# Patient Record
Sex: FEMALE | Race: WHITE | NOT HISPANIC OR LATINO | Employment: OTHER | URBAN - METROPOLITAN AREA
[De-identification: names, ages, dates, MRNs, and addresses within clinical notes are randomized per-mention and may not be internally consistent; named-entity substitution may affect disease eponyms.]

---

## 2023-07-14 DIAGNOSIS — E78.5 DYSLIPIDEMIA: Primary | ICD-10-CM

## 2023-07-14 DIAGNOSIS — I10 PRIMARY HYPERTENSION: ICD-10-CM

## 2023-07-14 RX ORDER — LISINOPRIL 40 MG/1
1 TABLET ORAL DAILY
COMMUNITY
Start: 2022-10-06 | End: 2023-07-14 | Stop reason: SDUPTHER

## 2023-07-14 RX ORDER — HYDROCHLOROTHIAZIDE 25 MG/1
25 TABLET ORAL DAILY
COMMUNITY
Start: 2023-06-05 | End: 2023-07-14 | Stop reason: SDUPTHER

## 2023-07-14 RX ORDER — PRAVASTATIN SODIUM 80 MG/1
1 TABLET ORAL DAILY
COMMUNITY
Start: 2021-04-20 | End: 2023-07-14 | Stop reason: SDUPTHER

## 2023-07-14 RX ORDER — PRAVASTATIN SODIUM 80 MG/1
80 TABLET ORAL DAILY
Qty: 90 TABLET | Refills: 3 | Status: SHIPPED | OUTPATIENT
Start: 2023-07-14 | End: 2023-08-21 | Stop reason: SDUPTHER

## 2023-07-14 RX ORDER — LISINOPRIL 40 MG/1
40 TABLET ORAL DAILY
Qty: 90 TABLET | Refills: 3 | Status: SHIPPED | OUTPATIENT
Start: 2023-07-14 | End: 2023-08-21 | Stop reason: SDUPTHER

## 2023-07-14 RX ORDER — HYDROCHLOROTHIAZIDE 25 MG/1
25 TABLET ORAL DAILY
Qty: 90 TABLET | Refills: 3 | Status: SHIPPED | OUTPATIENT
Start: 2023-07-14 | End: 2023-08-21 | Stop reason: SDUPTHER

## 2023-08-21 ENCOUNTER — OFFICE VISIT (OUTPATIENT)
Dept: PRIMARY CARE | Facility: CLINIC | Age: 68
End: 2023-08-21
Payer: MEDICARE

## 2023-08-21 VITALS
SYSTOLIC BLOOD PRESSURE: 120 MMHG | HEART RATE: 77 BPM | WEIGHT: 140 LBS | DIASTOLIC BLOOD PRESSURE: 70 MMHG | BODY MASS INDEX: 28.22 KG/M2 | HEIGHT: 59 IN | OXYGEN SATURATION: 96 % | TEMPERATURE: 97.7 F

## 2023-08-21 DIAGNOSIS — R10.13 EPIGASTRIC PAIN: ICD-10-CM

## 2023-08-21 DIAGNOSIS — F10.20 UNCOMPLICATED ALCOHOL DEPENDENCE (MULTI): ICD-10-CM

## 2023-08-21 DIAGNOSIS — K21.9 GERD WITHOUT ESOPHAGITIS: ICD-10-CM

## 2023-08-21 DIAGNOSIS — Z00.00 ROUTINE GENERAL MEDICAL EXAMINATION AT HEALTH CARE FACILITY: Primary | ICD-10-CM

## 2023-08-21 DIAGNOSIS — I10 PRIMARY HYPERTENSION: ICD-10-CM

## 2023-08-21 DIAGNOSIS — E78.5 DYSLIPIDEMIA: ICD-10-CM

## 2023-08-21 PROBLEM — F17.200 CURRENT SMOKER: Status: ACTIVE | Noted: 2023-08-21

## 2023-08-21 PROBLEM — E78.2 COMBINED HYPERLIPIDEMIA: Status: ACTIVE | Noted: 2023-08-21

## 2023-08-21 PROBLEM — H34.233: Status: ACTIVE | Noted: 2023-08-21

## 2023-08-21 PROBLEM — R00.2 PALPITATIONS: Status: ACTIVE | Noted: 2023-08-21

## 2023-08-21 LAB
ALANINE AMINOTRANSFERASE (SGPT) (U/L) IN SER/PLAS: 16 U/L (ref 7–45)
ALBUMIN (G/DL) IN SER/PLAS: 4.3 G/DL (ref 3.4–5)
ALKALINE PHOSPHATASE (U/L) IN SER/PLAS: 64 U/L (ref 33–136)
ANION GAP IN SER/PLAS: 14 MMOL/L (ref 10–20)
ASPARTATE AMINOTRANSFERASE (SGOT) (U/L) IN SER/PLAS: 15 U/L (ref 9–39)
BILIRUBIN TOTAL (MG/DL) IN SER/PLAS: 0.5 MG/DL (ref 0–1.2)
CALCIUM (MG/DL) IN SER/PLAS: 11 MG/DL (ref 8.6–10.6)
CARBON DIOXIDE, TOTAL (MMOL/L) IN SER/PLAS: 29 MMOL/L (ref 21–32)
CHLORIDE (MMOL/L) IN SER/PLAS: 95 MMOL/L (ref 98–107)
CHOLESTEROL (MG/DL) IN SER/PLAS: 189 MG/DL (ref 0–199)
CHOLESTEROL IN HDL (MG/DL) IN SER/PLAS: 54 MG/DL
CHOLESTEROL/HDL RATIO: 3.5
COBALAMIN (VITAMIN B12) (PG/ML) IN SER/PLAS: 436 PG/ML (ref 211–911)
CREATININE (MG/DL) IN SER/PLAS: 0.69 MG/DL (ref 0.5–1.05)
ERYTHROCYTE DISTRIBUTION WIDTH (RATIO) BY AUTOMATED COUNT: 12.9 % (ref 11.5–14.5)
ERYTHROCYTE MEAN CORPUSCULAR HEMOGLOBIN CONCENTRATION (G/DL) BY AUTOMATED: 33.3 G/DL (ref 32–36)
ERYTHROCYTE MEAN CORPUSCULAR VOLUME (FL) BY AUTOMATED COUNT: 91 FL (ref 80–100)
ERYTHROCYTES (10*6/UL) IN BLOOD BY AUTOMATED COUNT: 4.81 X10E12/L (ref 4–5.2)
GFR FEMALE: >90 ML/MIN/1.73M2
GLUCOSE (MG/DL) IN SER/PLAS: 90 MG/DL (ref 74–99)
HEMATOCRIT (%) IN BLOOD BY AUTOMATED COUNT: 43.6 % (ref 36–46)
HEMOGLOBIN (G/DL) IN BLOOD: 14.5 G/DL (ref 12–16)
LDL: 100 MG/DL (ref 0–99)
LEUKOCYTES (10*3/UL) IN BLOOD BY AUTOMATED COUNT: 8.9 X10E9/L (ref 4.4–11.3)
LIPASE (U/L) IN SER/PLAS: 14 U/L (ref 9–82)
NRBC (PER 100 WBCS) BY AUTOMATED COUNT: 0 /100 WBC (ref 0–0)
PLATELETS (10*3/UL) IN BLOOD AUTOMATED COUNT: 298 X10E9/L (ref 150–450)
POTASSIUM (MMOL/L) IN SER/PLAS: 4.9 MMOL/L (ref 3.5–5.3)
PROTEIN TOTAL: 7.2 G/DL (ref 6.4–8.2)
SODIUM (MMOL/L) IN SER/PLAS: 133 MMOL/L (ref 136–145)
TRIGLYCERIDE (MG/DL) IN SER/PLAS: 176 MG/DL (ref 0–149)
UREA NITROGEN (MG/DL) IN SER/PLAS: 11 MG/DL (ref 6–23)
VLDL: 35 MG/DL (ref 0–40)

## 2023-08-21 PROCEDURE — G0444 DEPRESSION SCREEN ANNUAL: HCPCS | Performed by: INTERNAL MEDICINE

## 2023-08-21 PROCEDURE — 83690 ASSAY OF LIPASE: CPT

## 2023-08-21 PROCEDURE — 1170F FXNL STATUS ASSESSED: CPT | Performed by: INTERNAL MEDICINE

## 2023-08-21 PROCEDURE — 3078F DIAST BP <80 MM HG: CPT | Performed by: INTERNAL MEDICINE

## 2023-08-21 PROCEDURE — 4004F PT TOBACCO SCREEN RCVD TLK: CPT | Performed by: INTERNAL MEDICINE

## 2023-08-21 PROCEDURE — G0439 PPPS, SUBSEQ VISIT: HCPCS | Performed by: INTERNAL MEDICINE

## 2023-08-21 PROCEDURE — 99406 BEHAV CHNG SMOKING 3-10 MIN: CPT | Performed by: INTERNAL MEDICINE

## 2023-08-21 PROCEDURE — 1159F MED LIST DOCD IN RCRD: CPT | Performed by: INTERNAL MEDICINE

## 2023-08-21 PROCEDURE — 85027 COMPLETE CBC AUTOMATED: CPT

## 2023-08-21 PROCEDURE — 1160F RVW MEDS BY RX/DR IN RCRD: CPT | Performed by: INTERNAL MEDICINE

## 2023-08-21 PROCEDURE — 82607 VITAMIN B-12: CPT

## 2023-08-21 PROCEDURE — 3074F SYST BP LT 130 MM HG: CPT | Performed by: INTERNAL MEDICINE

## 2023-08-21 PROCEDURE — 99214 OFFICE O/P EST MOD 30 MIN: CPT | Performed by: INTERNAL MEDICINE

## 2023-08-21 PROCEDURE — 80053 COMPREHEN METABOLIC PANEL: CPT

## 2023-08-21 PROCEDURE — 80061 LIPID PANEL: CPT

## 2023-08-21 RX ORDER — HYDROCHLOROTHIAZIDE 25 MG/1
25 TABLET ORAL DAILY
Qty: 90 TABLET | Refills: 3 | Status: SHIPPED | OUTPATIENT
Start: 2023-08-21

## 2023-08-21 RX ORDER — PRAVASTATIN SODIUM 80 MG/1
80 TABLET ORAL DAILY
Qty: 90 TABLET | Refills: 3 | Status: SHIPPED | OUTPATIENT
Start: 2023-08-21

## 2023-08-21 RX ORDER — OMEPRAZOLE 40 MG/1
40 CAPSULE, DELAYED RELEASE ORAL
Qty: 14 CAPSULE | Refills: 0 | Status: SHIPPED | OUTPATIENT
Start: 2023-08-21 | End: 2023-09-04

## 2023-08-21 RX ORDER — LISINOPRIL 40 MG/1
40 TABLET ORAL DAILY
Qty: 90 TABLET | Refills: 3 | Status: SHIPPED | OUTPATIENT
Start: 2023-08-21

## 2023-08-21 RX ORDER — ASPIRIN 81 MG/1
81 TABLET ORAL DAILY
COMMUNITY
End: 2023-08-21 | Stop reason: ALTCHOICE

## 2023-08-21 ASSESSMENT — ACTIVITIES OF DAILY LIVING (ADL)
DRESSING: INDEPENDENT
BATHING: INDEPENDENT
GROCERY_SHOPPING: INDEPENDENT
MANAGING_FINANCES: INDEPENDENT
DOING_HOUSEWORK: INDEPENDENT
TAKING_MEDICATION: INDEPENDENT

## 2023-08-21 ASSESSMENT — PATIENT HEALTH QUESTIONNAIRE - PHQ9
SUM OF ALL RESPONSES TO PHQ9 QUESTIONS 1 AND 2: 0
2. FEELING DOWN, DEPRESSED OR HOPELESS: NOT AT ALL
1. LITTLE INTEREST OR PLEASURE IN DOING THINGS: NOT AT ALL

## 2023-08-21 ASSESSMENT — ENCOUNTER SYMPTOMS
DEPRESSION: 0
OCCASIONAL FEELINGS OF UNSTEADINESS: 0
LOSS OF SENSATION IN FEET: 0

## 2023-08-21 NOTE — PROGRESS NOTES
Subjective   Vickie Abarca is a 68 y.o. female who presents for Medicare Annual Wellness Visit Subsequent and Follow-up.  HPI   Past medical history significant for many years of no primary care with uncontrolled hypertension, heavy smoking, alcohol dependence.      Overall feels good with no significant complaints.     Home blood pressure cuff brought in to the appointment with recent range from 113/57 to 145/72, averaging roughly around 120/70s.   No side effects or concerns.    Experiencing heartburn and reflux, almost every night after supper close to bedtime, takes 2 Tums every night which does help.  Also having intermittent epigastric sharp pains, no obvious aggravating or alleviating or causative factor.  No melena hematochezia or BRBPR.  Does describe some change in color of her urine, says it has been a light orange color.  Increased frequency and urgency without dysuria, not interested in treatment.  In the past had reported hematuria, reported as bright red blood in the urine occurring almost daily for the last 13 years, UA was normal.     She is still smoking 1.5 ppd (down from 3ppd originally though precontemplative about cutting down further)  She is still drinking 2-3 beers per night (down from 12 nightly though precontemplative about cutting down further)     Has a chronic cough at baseline but has not worsened, no chest pain, no dyspnea on exertion.      Doesn't leave the house except once a year and for doctors appointments, when asked if there might be an anxiety component to this she doesn't engage much and prefers that it be left alone.  Also similar regarding screening testing.     She has declined almost all screening tests, says she would not likely want any kind of treatment, certainly not chemotherapy if the cancer were to be found.  Discussed potential treatable cancers early stage but she does not want to go through the testing anyway.  She would like to get more information about a  living will and durable healthcare power of , reports her wish to not be resuscitated in the event of the cardiac arrest.         She is a very heavy smoker, 4-5 packs per day for many years, total of over 90 pack years. She also enjoys drinking beer regularly, cut down to 2-3 beers per night.     Initial visit was preceded by her eye doctor finding a plaque in the high concerning for atherosclerotic disease. She already had echocardiogram which showed impaired relaxation, carotid duplex was normal.  She was evaluated by Dr. Perez at [facility], found to have macular degeneration in the right eye and arterial plaques. Initial injections completed. Still has blurry vision in her right eye. Due for follow-up.     Tries to eat healthy. Does not exercise but is active cleaning the house. walks up and down the stairs.          She has 5 children, one passed in 2020 from overdose. Currently living with her daughter and family.      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)   · Current smoker (305.1) (F17.200)   · Encounter for screening for malignant neoplasm of lung (V76.0) (Z12.2)   · Encounter for screening for other disorder (V82.89) (Z13.89)   · Encounter for screening mammogram for breast cancer (V76.12) (Z12.31)   · GERD (gastroesophageal reflux disease) (530.81) (K21.9)   · History of hematuria (V13.09) (Z87.448)   · Hypercalcemia (275.42) (E83.52)   · Hypertension (401.9) (I10)   · Insect bite (919.4,E906.4) (W57.XXXA)   · Palpitations (785.1) (R00.2)   · 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)     Past Medical History     · History of Abdominal pain, chronic, epigastric (789.06,338.29) (R10.13,G89.29)   · Resolved Date: 05 Jul 2019   · History of hematuria (V13.09) (Z87.448)   · Resolved Date: 16 Apr 2019   · History of malignant neoplasm of skin (V10.83) (Z85.828)   · History of palpitations  "(V12.59) (Z87.898)   · Resolved Date: 05 Jul 2019     Surgical History     · History of Skin lesion excision   · History of Tubal ligation     Social History     · Current smoker (305.1) (F17.200)     Allergies     · Aspirin TABS   Adverse Reaction; Gatrointestinal upset; Vomiting; Updated By: Clarke Lorenzo; 4/16/2019 10:56:58 AM   · Codeine Derivatives   Rash; Recorded By: Clarke Lorenzo; 4/2/2019 2:13:59 PM  Objective   /78   Pulse 77   Temp 36.5 °C (97.7 °F)   Ht 1.499 m (4' 11\")   Wt 63.5 kg (140 lb)   SpO2 96%   BMI 28.28 kg/m²    Physical Exam  Gen: NAD, pleasant, A&Ox4  HEENT: EOMI, MMM  Neck: supple, no JVD  Pulm: lungs clear with mildly decreased air movement, normal work of breathing on room air  CV: RRR, no m/r/g  Abd: NABS, soft, nondistended, epigastric tenderness without guarding or rebound  Ext: no peripheral edema or cyanosis  Neuro: CN II-XII grossly intact, no focal sensory or motor deficits  MSK: no tenderness to palpation down spine  Assessment/Plan     ASCVD: Likely diffuse, arterial plaques found on opthalmic exam, stopped atorvastatin 20 mg due to cost, started pravastatin 40mg October 2021; increased to 80mg in feb 2022, cont. Discontinue aspirin given risk/benefit and prior GI intolerance.  - LDL 92, 10/19/2022    GERD with gastritis: Trial of PPI for 2 weeks  -consider future EGD especially given family history of throat cancer and patient's smoking history, previously declined     Hypertension: Significantly improved control, likely very chronic given she has not followed with primary care in years until establishing here. With her ASCVD, ACE inhibitor warranted.   - cont lisinopril 40mg, Hctz 25mg. Home blood pressure cuff calibrated 10/18/2021.     Palpitations/dizziness noted on prior visits: ordered Zio patch for monitoring, not done and not interested in doing that today; appears to have mild orthostasis, normal carotid duplex in 2019; has been less of a concern, " infrequent     Hypercalcemia: Last levels were normal when corrected for albumin, vitamin D levels are low        Heavy smoker: Declines CT and AAA screening or pharmacologic measures to help quit, has cut down significantly but is precontemplative about cutting down further (1.5ppd smoker now from 3ppd)     Alcohol dependence: Has tapered down to 2- 3 drinks daily, encouraged continued tapering but is precontemplative about cutting down further.     Hematuria: None on urinalysis, 4/2/2019, light-orange urine is not likely a concern, will monitor     HM: Will continue to discuss health maintenance and address what the patient is agreeable to doing. Patient expresses that she has mostly declined screening tests because she would not be interested in most interventions if these screenings were to come up positive   Mammogram- ordered, not done, not willing  Pap smear- not indicated given age  LD CT- patient declines  Colon cancer- patient is considering Cologuard, but declines today, will think about it  Vaccines-declining all vaccines at this time including flu  Discussed healthy eating and exercise  Encouraged smoking cessation  DEXA scan ordered, not done and says she does not want to do it  Problem List Items Addressed This Visit       Uncomplicated alcohol dependence (CMS/HCC)    Relevant Medications    omeprazole (PriLOSEC) 40 mg DR capsule    Other Relevant Orders    Comprehensive Metabolic Panel    Lipid Panel    CBC    Vitamin B12    Lipase     Other Visit Diagnoses       Routine general medical examination at health care facility    -  Primary    Relevant Medications    omeprazole (PriLOSEC) 40 mg DR capsule    Other Relevant Orders    Comprehensive Metabolic Panel    Lipid Panel    CBC    Vitamin B12    Lipase    Primary hypertension        Relevant Medications    hydroCHLOROthiazide (HYDRODiuril) 25 mg tablet    lisinopril 40 mg tablet    omeprazole (PriLOSEC) 40 mg DR capsule    Other Relevant Orders     Comprehensive Metabolic Panel    Lipid Panel    CBC    Vitamin B12    Lipase    Dyslipidemia        Relevant Medications    pravastatin (Pravachol) 80 mg tablet    omeprazole (PriLOSEC) 40 mg DR capsule    Other Relevant Orders    Comprehensive Metabolic Panel    Lipid Panel    CBC    Vitamin B12    Lipase    Epigastric pain        Relevant Medications    omeprazole (PriLOSEC) 40 mg DR capsule    Other Relevant Orders    Comprehensive Metabolic Panel    Lipid Panel    CBC    Vitamin B12    Lipase    GERD without esophagitis        Relevant Medications    omeprazole (PriLOSEC) 40 mg DR capsule    Other Relevant Orders    Comprehensive Metabolic Panel    Lipid Panel    CBC    Vitamin B12    Lipase                 Clarke Lorenzo MD

## 2024-02-20 ENCOUNTER — OFFICE VISIT (OUTPATIENT)
Dept: PRIMARY CARE | Facility: CLINIC | Age: 69
End: 2024-02-20
Payer: MEDICARE

## 2024-02-20 VITALS
BODY MASS INDEX: 27.82 KG/M2 | SYSTOLIC BLOOD PRESSURE: 120 MMHG | HEART RATE: 78 BPM | HEIGHT: 59 IN | WEIGHT: 138 LBS | DIASTOLIC BLOOD PRESSURE: 70 MMHG | OXYGEN SATURATION: 96 % | TEMPERATURE: 97.3 F

## 2024-02-20 DIAGNOSIS — I10 PRIMARY HYPERTENSION: ICD-10-CM

## 2024-02-20 DIAGNOSIS — F10.20 UNCOMPLICATED ALCOHOL DEPENDENCE (MULTI): ICD-10-CM

## 2024-02-20 DIAGNOSIS — K21.9 GASTROESOPHAGEAL REFLUX DISEASE, UNSPECIFIED WHETHER ESOPHAGITIS PRESENT: Primary | ICD-10-CM

## 2024-02-20 DIAGNOSIS — R30.0 DYSURIA: ICD-10-CM

## 2024-02-20 DIAGNOSIS — E83.52 HYPERCALCEMIA: ICD-10-CM

## 2024-02-20 DIAGNOSIS — E55.9 VITAMIN D DEFICIENCY: ICD-10-CM

## 2024-02-20 LAB
APPEARANCE UR: CLEAR
BILIRUB UR QL STRIP: NEGATIVE
COLOR UR: YELLOW
ERYTHROCYTE [DISTWIDTH] IN BLOOD BY AUTOMATED COUNT: 13.2 % (ref 11.5–14.5)
GLUCOSE UR STRIP-MCNC: NEGATIVE MG/DL
HCT VFR BLD AUTO: 44.4 % (ref 36–46)
HGB BLD-MCNC: 14.6 G/DL (ref 12–16)
HGB UR QL STRIP: NEGATIVE
KETONES UR STRIP-MCNC: NEGATIVE MG/DL
LEUKOCYTE ESTERASE UR QL STRIP: ABNORMAL
MCH RBC QN AUTO: 30.3 PG (ref 26–34)
MCHC RBC AUTO-ENTMCNC: 32.9 G/DL (ref 32–36)
MCV RBC AUTO: 92 FL (ref 80–100)
NITRITE UR QL STRIP: NEGATIVE
NRBC BLD-RTO: 0 /100 WBCS (ref 0–0)
PH UR STRIP: 7 [PH]
PLATELET # BLD AUTO: 314 X10*3/UL (ref 150–450)
PROT UR STRIP-MCNC: NEGATIVE MG/DL
RBC # BLD AUTO: 4.82 X10*6/UL (ref 4–5.2)
SP GR UR STRIP.AUTO: 1.01
UROBILINOGEN UR STRIP-ACNC: 0.2 E.U./DL
WBC # BLD AUTO: 8.3 X10*3/UL (ref 4.4–11.3)

## 2024-02-20 PROCEDURE — 3074F SYST BP LT 130 MM HG: CPT | Performed by: INTERNAL MEDICINE

## 2024-02-20 PROCEDURE — 81003 URINALYSIS AUTO W/O SCOPE: CPT | Performed by: INTERNAL MEDICINE

## 2024-02-20 PROCEDURE — 3078F DIAST BP <80 MM HG: CPT | Performed by: INTERNAL MEDICINE

## 2024-02-20 PROCEDURE — 36415 COLL VENOUS BLD VENIPUNCTURE: CPT

## 2024-02-20 PROCEDURE — 80061 LIPID PANEL: CPT

## 2024-02-20 PROCEDURE — 83970 ASSAY OF PARATHORMONE: CPT

## 2024-02-20 PROCEDURE — 99214 OFFICE O/P EST MOD 30 MIN: CPT | Performed by: INTERNAL MEDICINE

## 2024-02-20 PROCEDURE — 82306 VITAMIN D 25 HYDROXY: CPT

## 2024-02-20 PROCEDURE — 1170F FXNL STATUS ASSESSED: CPT | Performed by: INTERNAL MEDICINE

## 2024-02-20 PROCEDURE — 80053 COMPREHEN METABOLIC PANEL: CPT

## 2024-02-20 PROCEDURE — 85027 COMPLETE CBC AUTOMATED: CPT

## 2024-02-20 ASSESSMENT — ACTIVITIES OF DAILY LIVING (ADL)
BATHING: INDEPENDENT
MANAGING_FINANCES: TOTAL CARE
DOING_HOUSEWORK: INDEPENDENT
TAKING_MEDICATION: INDEPENDENT
DRESSING: INDEPENDENT
GROCERY_SHOPPING: TOTAL CARE

## 2024-02-20 ASSESSMENT — ENCOUNTER SYMPTOMS
LOSS OF SENSATION IN FEET: 0
OCCASIONAL FEELINGS OF UNSTEADINESS: 0
DEPRESSION: 0

## 2024-02-20 NOTE — PROGRESS NOTES
Subjective   Vickie Abarca is a 69 y.o. female who presents for Follow-up.  HPI   Past medical history significant for many years of no primary care with uncontrolled hypertension, heavy smoking, alcohol dependence.      Overall feels good with no significant complaints.     Home blood pressure cuff brought in to the appointment with recent range from 113/57 to 145/72, averaging roughly around 120/70s.   No side effects or concerns.    Still experiencing heartburn and reflux, almost every night after supper close to bedtime, takes 2 Tums every night which does help.    Prescribed omeprazole at the last visit but she did not like having to take the extra tablet especially trying to remember before eating and concerned about cost.  Also having intermittent epigastric sharp pains, no obvious aggravating or alleviating or causative factor.  No melena hematochezia or BRBPR.  Also has intermittent constipation, she does use the bathroom daily but sometimes needs to take a laxative, sometimes she has abdominal pain which resolves with having a bowel movement.  When she has constipation her bowel movements tend to be small round and pellet-like.    Reports that urine is dark and opaque.  Notices this in the morning as she urinates in the bucket overnight and out of fear of falling down the stairs she does not go to the bathroom until the morning when she empties out the bucket.  Says it comes out somewhat dark and red-tinged.  When she goes to the bathroom she has not noticed any color changes except for darker urine.  No dysuria, increased urgency or frequency.  Possibly less urine output altogether, ongoing for last 6 months.  In the past had reported hematuria, reported as bright red blood in the urine occurring almost daily for the last 13 years, UA was normal.     She is still smoking 1.5 ppd (down from 3ppd originally though precontemplative about cutting down further)  She is still drinking 2-3 beers per night (down  from 12 nightly though precontemplative about cutting down further)    **COPIED FORWARD FOR REFERENCE**     Has a chronic cough at baseline but has not worsened, no chest pain, no dyspnea on exertion.      Doesn't leave the house except once a year and for doctors appointments, when asked if there might be an anxiety component to this she doesn't engage much and prefers that it be left alone.  Also similar regarding screening testing.     She has declined almost all screening tests, says she would not likely want any kind of treatment, certainly not chemotherapy if the cancer were to be found.  Discussed potential treatable cancers early stage but she does not want to go through the testing anyway.  She would like to get more information about a living will and durable healthcare power of , reports her wish to not be resuscitated in the event of the cardiac arrest.         She is a very heavy smoker, 4-5 packs per day for many years, total of over 90 pack years. She also enjoys drinking beer regularly, cut down to 2-3 beers per night.     Initial visit was preceded by her eye doctor finding a plaque in the high concerning for atherosclerotic disease. She already had echocardiogram which showed impaired relaxation, carotid duplex was normal.  She was evaluated by Dr. Perez at [facility], found to have macular degeneration in the right eye and arterial plaques. Initial injections completed. Still has blurry vision in her right eye. Due for follow-up.     Tries to eat healthy. Does not exercise but is active cleaning the house. walks up and down the stairs.          She has 5 children, one passed in 2020 from overdose. Currently living with her daughter and family.      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)   · Current smoker (305.1) (F17.200)   · Encounter for screening for malignant neoplasm of lung (V76.0) (Z12.2)   ·  "Encounter for screening for other disorder (V82.89) (Z13.89)   · Encounter for screening mammogram for breast cancer (V76.12) (Z12.31)   · GERD (gastroesophageal reflux disease) (530.81) (K21.9)   · History of hematuria (V13.09) (Z87.448)   · Hypercalcemia (275.42) (E83.52)   · Hypertension (401.9) (I10)   · Insect bite (919.4,E906.4) (W57.XXXA)   · Palpitations (785.1) (R00.2)   · 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)     Past Medical History     · History of Abdominal pain, chronic, epigastric (789.06,338.29) (R10.13,G89.29)   · Resolved Date: 05 Jul 2019   · History of hematuria (V13.09) (Z87.448)   · Resolved Date: 16 Apr 2019   · History of malignant neoplasm of skin (V10.83) (Z85.828)   · History of palpitations (V12.59) (Z87.898)   · Resolved Date: 05 Jul 2019     Surgical History     · History of Skin lesion excision   · History of Tubal ligation     Social History     · Current smoker (305.1) (F17.200)     Allergies     · Aspirin TABS   Adverse Reaction; Gatrointestinal upset; Vomiting; Updated By: Clarke Lorenzo; 4/16/2019 10:56:58 AM   · Codeine Derivatives   Rash; Recorded By: Clarke Lorenzo; 4/2/2019 2:13:59 PM  Objective   /78   Pulse 78   Temp 36.3 °C (97.3 °F)   Ht 1.499 m (4' 11\")   Wt 62.6 kg (138 lb)   SpO2 96%   BMI 27.87 kg/m²    Physical Exam  Gen: NAD, pleasant, A&Ox4  HEENT: EOMI, MMM  Neck: supple, no JVD  Pulm: lungs clear with mildly decreased air movement, normal work of breathing on room air  CV: RRR, no m/r/g  Abd: NABS, soft, nondistended, epigastric tenderness without guarding or rebound  Ext: no peripheral edema or cyanosis  Neuro: CN II-XII grossly intact, no focal sensory or motor deficits  MSK: no tenderness to palpation down spine  Assessment/Plan     ASCVD: Likely diffuse, arterial plaques found on opthalmic exam, stopped atorvastatin 20 mg due to cost, started pravastatin 40mg October 2021; increased to 80mg in feb 2022, cont. " Discontinue aspirin given risk/benefit and prior GI intolerance.  - LDL 92, 10/19/2022    GERD with gastritis: Trial of PPI   -consider future EGD especially given family history of throat cancer and patient's smoking history, previously declined    Dark urine: Likely sediment in the urine that is left out overnight, no other symptoms, with her age and smoking history, microscopic hematuria/bladder cancer needs to be considered.  We performed a UA today which was unremarkable, she was given urine sample cups in case she has visible hematuria in the future.     Hypertension: Significantly improved control, likely very chronic given she has not followed with primary care in years until establishing here. With her ASCVD, ACE inhibitor warranted.   - cont lisinopril 40mg, Hctz 25mg. Home blood pressure cuff calibrated 10/18/2021.     Palpitations/dizziness noted on prior visits: ordered Zio patch for monitoring, not done and not interested in doing that today; appears to have mild orthostasis, normal carotid duplex in 2019; has been less of a concern, infrequent     Hypercalcemia: Last levels were normal when corrected for albumin, vitamin D levels are low        Heavy smoker: Declines CT and AAA screening or pharmacologic measures to help quit, has cut down significantly but is precontemplative about cutting down further (1.5ppd smoker now from 3ppd)     Alcohol dependence: Has tapered down to 2- 3 drinks daily, encouraged continued tapering but is precontemplative about cutting down further.     Hematuria: None on urinalysis, 4/2/2019, light-orange urine is not likely a concern, will monitor     HM: Will continue to discuss health maintenance and address what the patient is agreeable to doing. Patient expresses that she has mostly declined screening tests because she would not be interested in most interventions if these screenings were to come up positive   Mammogram- ordered, not done, not willing  Pap smear- not  indicated given age  LD CT- patient declines  Colon cancer- patient is considering Cologuard, but declines today, will think about it  Vaccines-declining all vaccines at this time including flu  Discussed healthy eating and exercise  Encouraged smoking cessation  DEXA scan ordered, not done and says she does not want to do it  Problem List Items Addressed This Visit    None  Visit Diagnoses       Dysuria        Relevant Orders    POCT UA (Automated) docked device                 Clarke Lorenzo MD

## 2024-02-21 LAB
25(OH)D3 SERPL-MCNC: 7 NG/ML (ref 30–100)
ALBUMIN SERPL BCP-MCNC: 4.6 G/DL (ref 3.4–5)
ALP SERPL-CCNC: 69 U/L (ref 33–136)
ALT SERPL W P-5'-P-CCNC: 14 U/L (ref 7–45)
ANION GAP SERPL CALC-SCNC: 16 MMOL/L (ref 10–20)
AST SERPL W P-5'-P-CCNC: 16 U/L (ref 9–39)
BILIRUB SERPL-MCNC: 0.5 MG/DL (ref 0–1.2)
BUN SERPL-MCNC: 12 MG/DL (ref 6–23)
CALCIUM SERPL-MCNC: 11.2 MG/DL (ref 8.6–10.6)
CHLORIDE SERPL-SCNC: 98 MMOL/L (ref 98–107)
CHOLEST SERPL-MCNC: 195 MG/DL (ref 0–199)
CHOLESTEROL/HDL RATIO: 3.4
CO2 SERPL-SCNC: 29 MMOL/L (ref 21–32)
CREAT SERPL-MCNC: 0.71 MG/DL (ref 0.5–1.05)
EGFRCR SERPLBLD CKD-EPI 2021: >90 ML/MIN/1.73M*2
GLUCOSE SERPL-MCNC: 90 MG/DL (ref 74–99)
HDLC SERPL-MCNC: 57.2 MG/DL
LDLC SERPL CALC-MCNC: 105 MG/DL
NON HDL CHOLESTEROL: 138 MG/DL (ref 0–149)
POTASSIUM SERPL-SCNC: 5.1 MMOL/L (ref 3.5–5.3)
PROT SERPL-MCNC: 7.2 G/DL (ref 6.4–8.2)
PTH-INTACT SERPL-MCNC: 71.8 PG/ML (ref 18.5–88)
SODIUM SERPL-SCNC: 138 MMOL/L (ref 136–145)
TRIGL SERPL-MCNC: 163 MG/DL (ref 0–149)
VLDL: 33 MG/DL (ref 0–40)

## 2024-02-29 RX ORDER — ERGOCALCIFEROL 1.25 MG/1
50000 CAPSULE ORAL
Qty: 4 CAPSULE | Refills: 2 | Status: SHIPPED | OUTPATIENT
Start: 2024-02-29 | End: 2024-05-23

## 2024-08-20 ENCOUNTER — APPOINTMENT (OUTPATIENT)
Dept: PRIMARY CARE | Facility: CLINIC | Age: 69
End: 2024-08-20
Payer: MEDICARE

## 2024-08-20 VITALS
HEART RATE: 78 BPM | SYSTOLIC BLOOD PRESSURE: 134 MMHG | OXYGEN SATURATION: 96 % | BODY MASS INDEX: 28.02 KG/M2 | DIASTOLIC BLOOD PRESSURE: 82 MMHG | HEIGHT: 59 IN | WEIGHT: 139 LBS

## 2024-08-20 DIAGNOSIS — F17.200 CURRENT SMOKER: ICD-10-CM

## 2024-08-20 DIAGNOSIS — K21.9 GASTROESOPHAGEAL REFLUX DISEASE, UNSPECIFIED WHETHER ESOPHAGITIS PRESENT: ICD-10-CM

## 2024-08-20 DIAGNOSIS — F10.20 UNCOMPLICATED ALCOHOL DEPENDENCE (MULTI): ICD-10-CM

## 2024-08-20 DIAGNOSIS — Z00.00 ROUTINE GENERAL MEDICAL EXAMINATION AT HEALTH CARE FACILITY: Primary | ICD-10-CM

## 2024-08-20 DIAGNOSIS — I10 PRIMARY HYPERTENSION: ICD-10-CM

## 2024-08-20 PROCEDURE — G0439 PPPS, SUBSEQ VISIT: HCPCS | Performed by: INTERNAL MEDICINE

## 2024-08-20 PROCEDURE — G0444 DEPRESSION SCREEN ANNUAL: HCPCS | Performed by: INTERNAL MEDICINE

## 2024-08-20 PROCEDURE — 1159F MED LIST DOCD IN RCRD: CPT | Performed by: INTERNAL MEDICINE

## 2024-08-20 PROCEDURE — 3008F BODY MASS INDEX DOCD: CPT | Performed by: INTERNAL MEDICINE

## 2024-08-20 PROCEDURE — 1170F FXNL STATUS ASSESSED: CPT | Performed by: INTERNAL MEDICINE

## 2024-08-20 PROCEDURE — 4004F PT TOBACCO SCREEN RCVD TLK: CPT | Performed by: INTERNAL MEDICINE

## 2024-08-20 PROCEDURE — 3078F DIAST BP <80 MM HG: CPT | Performed by: INTERNAL MEDICINE

## 2024-08-20 PROCEDURE — 1160F RVW MEDS BY RX/DR IN RCRD: CPT | Performed by: INTERNAL MEDICINE

## 2024-08-20 PROCEDURE — G0446 INTENS BEHAVE THER CARDIO DX: HCPCS | Performed by: INTERNAL MEDICINE

## 2024-08-20 PROCEDURE — 1123F ACP DISCUSS/DSCN MKR DOCD: CPT | Performed by: INTERNAL MEDICINE

## 2024-08-20 PROCEDURE — 99406 BEHAV CHNG SMOKING 3-10 MIN: CPT | Performed by: INTERNAL MEDICINE

## 2024-08-20 PROCEDURE — 3075F SYST BP GE 130 - 139MM HG: CPT | Performed by: INTERNAL MEDICINE

## 2024-08-20 ASSESSMENT — ACTIVITIES OF DAILY LIVING (ADL)
GROCERY_SHOPPING: INDEPENDENT
TAKING_MEDICATION: INDEPENDENT
DRESSING: INDEPENDENT
DOING_HOUSEWORK: INDEPENDENT
MANAGING_FINANCES: INDEPENDENT
BATHING: INDEPENDENT

## 2024-08-20 ASSESSMENT — PATIENT HEALTH QUESTIONNAIRE - PHQ9
1. LITTLE INTEREST OR PLEASURE IN DOING THINGS: NOT AT ALL
SUM OF ALL RESPONSES TO PHQ9 QUESTIONS 1 AND 2: 0
1. LITTLE INTEREST OR PLEASURE IN DOING THINGS: NOT AT ALL
SUM OF ALL RESPONSES TO PHQ9 QUESTIONS 1 AND 2: 0
2. FEELING DOWN, DEPRESSED OR HOPELESS: NOT AT ALL
2. FEELING DOWN, DEPRESSED OR HOPELESS: NOT AT ALL

## 2024-08-20 ASSESSMENT — ENCOUNTER SYMPTOMS
OCCASIONAL FEELINGS OF UNSTEADINESS: 0
LOSS OF SENSATION IN FEET: 0
DEPRESSION: 0

## 2024-08-20 NOTE — PROGRESS NOTES
Subjective   Shania Abarca is a 69 y.o. female who presents for Medicare Annual Wellness Visit Subsequent.  HPI   Past medical history significant for many years of no primary care with uncontrolled hypertension, heavy smoking, alcohol dependence.      Overall feels good with no significant complaints.     Home blood pressure cuff brought in to the appointment with recent range from 113/57 to 145/72, averaging roughly around 120/70s.   No side effects or concerns.    Still experiencing heartburn and reflux, almost every night after supper close to bedtime, takes 2 Tums every night which does help.    Prescribed omeprazole at the last 2 visits but she did not like having to take the extra tablet especially trying to remember before eating and concerned about cost so she has not and is not interested in changing her TUMS regimen at this time.  No melena hematochezia or BRBPR.     She is still smoking 1.5 ppd (down from 3ppd originally though precontemplative about cutting down further)  She is still drinking 1-3 beers per night (down from 12 nightly though precontemplative about cutting down further)    **COPIED FORWARD FOR REFERENCE**     Has a chronic cough at baseline but has not worsened, no chest pain, no dyspnea on exertion.      Doesn't leave the house except once a year and for doctors appointments, when asked if there might be an anxiety component to this she doesn't engage much and prefers that it be left alone.  Also similar regarding screening testing.     She has declined almost all screening tests, says she would not likely want any kind of treatment, certainly not chemotherapy if the cancer were to be found.  Discussed potential treatable cancers early stage but she does not want to go through the testing anyway.  She would like to get more information about a living will and durable healthcare power of , reports her wish to not be resuscitated in the event of the cardiac arrest.         She is  "a very heavy smoker, 4-5 packs per day for many years, total of over 90 pack years. She also enjoys drinking beer regularly, cut down to 2-3 beers per night.     Initial visit was preceded by her eye doctor finding a plaque in the high concerning for atherosclerotic disease. She already had echocardiogram which showed impaired relaxation, carotid duplex was normal.  She was evaluated by Dr. Perez at [facility], found to have macular degeneration in the right eye and arterial plaques. Initial injections completed. Still has blurry vision in her right eye. Due for follow-up.     Tries to eat healthy. Does not exercise but is active cleaning the house. walks up and down the stairs.        She has 5 children, one passed in 2020 from overdose. Currently living with her daughter and family.     Objective   /82 Comment: home BP  Pulse 78   Ht 1.499 m (4' 11\")   Wt 63 kg (139 lb)   SpO2 96%   BMI 28.07 kg/m²    Physical Exam  Gen: NAD, pleasant, A&Ox4  HEENT: EOMI, MMM  Neck: supple, no JVD  Pulm: lungs clear with mildly decreased air movement, normal work of breathing on room air  CV: RRR, no m/r/g  Abd: NABS, soft, nondistended, epigastric tenderness without guarding or rebound  Ext: no peripheral edema or cyanosis  Neuro: CN II-XII grossly intact, no focal sensory or motor deficits  MSK: no tenderness to palpation down spine  Assessment/Plan     ASCVD: Likely diffuse, arterial plaques found on opthalmic exam, stopped atorvastatin 20 mg due to cost, started pravastatin 40mg October 2021; increased to 80mg in feb 2022, cont. Discontinue aspirin given risk/benefit and prior GI intolerance.  - , 2/20/2024    GERD with gastritis: has declined Trial of PPI   -consider future EGD especially given family history of throat cancer and patient's smoking history, previously declined    Dark urine: Likely sediment in the urine that is left out overnight, no other symptoms.  We performed a UA 2/20/2024 which was " unremarkable, she was given urine sample cups in case she has visible hematuria in the future.     Hypertension: Significantly improved control, likely very chronic given she has not followed with primary care in years until establishing here. With her ASCVD, ACE inhibitor warranted.   - cont lisinopril 40mg, Hctz 25mg. Home blood pressure cuff calibrated 10/18/2021.     Palpitations/dizziness:  reportedly not recurrent/resolved  - previously ordered Zio patch for monitoring, not done   - normal carotid duplex in 2019     Hypercalcemia: Last levels were normal when corrected for albumin, vitamin D levels are low, PTH was normal, not suppressed  - says she keeps forgetting to take her vitamin D, reminded today      Heavy smoker: Declines CT and AAA screening or pharmacologic measures to help quit, has cut down significantly but is precontemplative about cutting down further (1.5ppd smoker now from 3ppd); >3 minutes discussion/counseling     Alcohol dependence: Has tapered down to 2- 3 drinks daily, encouraged continued tapering but is precontemplative about cutting down further.        HM: Will continue to discuss health maintenance and address what the patient is agreeable to doing. Patient expresses that she has mostly declined screening tests because she would not be interested in most interventions if these screenings were to come up positive   Mammogram- ordered, not done, not willing  Pap smear- not indicated given age  LD CT- patient declines  Colon cancer- patient was considering Cologuard, but declines now  Vaccines-declining all vaccines at this time including flu  Discussed healthy eating and exercise  Encouraged smoking cessation  DEXA scan ordered, not done and says she does not want to do it  Problem List Items Addressed This Visit    None    5-10 minutes were spent on screening for Depression.  The 10-year ASCVD risk score (Scott WILLETT, et al., 2019) is: 23.1%    Values used to calculate the score:       Age: 69 years      Sex: Female      Is Non- : No      Diabetic: No      Tobacco smoker: Yes      Systolic Blood Pressure: 134 mmHg      Is BP treated: Yes      HDL Cholesterol: 57.2 mg/dL      Total Cholesterol: 195 mg/dL  Cardiovascular risk discussed and modifiable risk factors addressed.  Discussion included options of pharmaceutical interventions and recommended lifestyle modifications, including nutritional choices, exercise, and elimination of habits contributing to risk.   >15 minutes spent on assessment and discussion.           Clarke Lorenzo MD

## 2024-08-27 DIAGNOSIS — I10 PRIMARY HYPERTENSION: ICD-10-CM

## 2024-08-27 DIAGNOSIS — E78.5 DYSLIPIDEMIA: ICD-10-CM

## 2024-08-27 RX ORDER — HYDROCHLOROTHIAZIDE 25 MG/1
25 TABLET ORAL DAILY
Qty: 90 TABLET | Refills: 3 | Status: SHIPPED | OUTPATIENT
Start: 2024-08-27

## 2024-08-27 RX ORDER — PRAVASTATIN SODIUM 80 MG/1
80 TABLET ORAL DAILY
Qty: 90 TABLET | Refills: 3 | Status: SHIPPED | OUTPATIENT
Start: 2024-08-27

## 2024-08-27 RX ORDER — LISINOPRIL 40 MG/1
40 TABLET ORAL DAILY
Qty: 90 TABLET | Refills: 3 | Status: SHIPPED | OUTPATIENT
Start: 2024-08-27

## 2025-02-18 ENCOUNTER — APPOINTMENT (OUTPATIENT)
Dept: PRIMARY CARE | Facility: CLINIC | Age: 70
End: 2025-02-18
Payer: MEDICARE

## 2025-02-18 VITALS
WEIGHT: 134.8 LBS | TEMPERATURE: 94.1 F | HEART RATE: 75 BPM | OXYGEN SATURATION: 94 % | SYSTOLIC BLOOD PRESSURE: 138 MMHG | HEIGHT: 59 IN | BODY MASS INDEX: 27.17 KG/M2 | DIASTOLIC BLOOD PRESSURE: 82 MMHG

## 2025-02-18 DIAGNOSIS — E55.9 VITAMIN D DEFICIENCY: ICD-10-CM

## 2025-02-18 DIAGNOSIS — E83.52 HYPERCALCEMIA: ICD-10-CM

## 2025-02-18 DIAGNOSIS — F17.200 CURRENT SMOKER: ICD-10-CM

## 2025-02-18 DIAGNOSIS — I10 PRIMARY HYPERTENSION: ICD-10-CM

## 2025-02-18 DIAGNOSIS — F10.20 UNCOMPLICATED ALCOHOL DEPENDENCE (MULTI): ICD-10-CM

## 2025-02-18 DIAGNOSIS — E78.2 COMBINED HYPERLIPIDEMIA: Primary | ICD-10-CM

## 2025-02-18 DIAGNOSIS — Z00.00 ROUTINE GENERAL MEDICAL EXAMINATION AT HEALTH CARE FACILITY: ICD-10-CM

## 2025-02-18 ASSESSMENT — ENCOUNTER SYMPTOMS
FATIGUE: 0
ABDOMINAL PAIN: 0
FREQUENCY: 0
NAUSEA: 0
OCCASIONAL FEELINGS OF UNSTEADINESS: 1
SHORTNESS OF BREATH: 0
DIARRHEA: 0
LOSS OF SENSATION IN FEET: 0
DEPRESSION: 0
CONSTIPATION: 0
VOMITING: 0

## 2025-02-18 NOTE — PATIENT INSTRUCTIONS
You can take over the counter vitamin D 3 2000 units daily for 3 months. After 3 months you can take 800 to 1000 units daily to maintain your Vitamin D

## 2025-02-18 NOTE — PROGRESS NOTES
Subjective   Patient ID: Shania Abarca is a 70 y.o. female who presents for Follow-up.    HPI      Past medical history significant for many years of no primary care with uncontrolled hypertension, heavy smoking, alcohol dependence here today for 6 month follow up.      Interim:  Overall feels good with no significant complaints.    Takes her ambulatory BP at home. BP at home 120-130s/70s today in office it was 138/82       Does not get heartburn very often anymore and if she does uses OTC anti-acids and it helps.    She has cut back significant on drinking, now only 1 beer a night, 1 bud light, no hard liquor. Denies any symptoms or hx of withdrawal, Dts. (down from 12 nightly and considering cutting down further)    She is still smoking 1.5 ppd (down from 3ppd originally though precontemplative about cutting down further), interested in cutting back slowly, writes down how much she smokes when she decides to cut back again.    Denies any constipation, diarrhea, nausea or vomiting, no new vision changes. No chest and , no swallowing or breathing difficulty.    Last saw the eye doctor about 3 years ago and was getting injections for the macular degeneration but it was not improving and patient states she was told the injections would not improve or stop progression, still has poor vision out of right eye. Does not drive anymore.     Sleep: Good quality, falls asleep. Does get up 3-4 times to pee but falls back asleep   No issues making it to the restroom during the day.  Mood: Feeling mood is good. Was taking care of 3 y.o. relative but finds it rewarding  Diet: Wide variety, not picky. Vegetables. Cooks for herself, not much fast food.  Exercise: Cleaning around the house.     Lives with daughter and son in law.,grandchildren and 3 y.o. niece    **COPIED FORWARD FOR REFERENCE**    At last appointment 08/2024, home blood pressure cuff brought in to the appointment with recent range from 113/57 to 145/72, averaging  roughly around 120/70s.   No side effects or concerns.     Had issue of chronic heart burn in past. Previously prescribed omeprazole at the last 2 visits but she did not like having to take the extra tablet especially trying to remember before eating and concerned about cost so she has not and is not interested in changing her TUMS regimen at this time.  No melena hematochezia or BRBPR.      Has a chronic cough at baseline but has not worsened, no chest pain, no dyspnea on exertion.      Doesn't leave the house except once a year and for doctors appointments, when asked if there might be an anxiety component to this she doesn't engage much and prefers that it be left alone.  Also similar regarding screening testing.     She has declined almost all screening tests, says she would not likely want any kind of treatment, certainly not chemotherapy if the cancer were to be found.  Discussed potential treatable cancers early stage but she does not want to go through the testing anyway.  She would like to get more information about a living will and durable healthcare power of , reports her wish to not be resuscitated in the event of the cardiac arrest.      She was a very heavy smoker, 4-5 packs per day for many years, total of over 90 pack years. She also enjoys drinking beer regularly, cut down to 2-3 beers per night.     Initial visit was preceded by her eye doctor finding a plaque in the high concerning for atherosclerotic disease. She already had echocardiogram which showed impaired relaxation, carotid duplex was normal.  She was evaluated by Dr. Perez at [facility], found to have macular degeneration in the right eye and arterial plaques. Initial injections completed. Still has blurry vision in her right eye. Due for follow-up.     Tries to eat healthy. Does not exercise but is active cleaning the house. walks up and down the stairs.       She has 5 children, one passed in 2020 from overdose. Currently  "living with her daughter and family.     Review of Systems   Constitutional:  Negative for fatigue.   Respiratory:  Negative for shortness of breath.    Cardiovascular:  Negative for chest pain.   Gastrointestinal:  Negative for abdominal pain, constipation, diarrhea, nausea and vomiting.   Genitourinary:  Negative for frequency.       Objective   /82 (BP Location: Left arm, Patient Position: Sitting, BP Cuff Size: Adult)   Pulse 75   Temp 34.5 °C (94.1 °F) (Temporal)   Ht 1.499 m (4' 11\")   Wt 61.1 kg (134 lb 12.8 oz)   SpO2 94%   BMI 27.23 kg/m²     Physical Exam  Constitutional:       General: She is not in acute distress.     Appearance: Normal appearance.   HENT:      Head: Normocephalic and atraumatic.      Right Ear: Tympanic membrane, ear canal and external ear normal.      Left Ear: Tympanic membrane, ear canal and external ear normal.   Eyes:      Extraocular Movements: Extraocular movements intact.      Pupils: Pupils are equal, round, and reactive to light.   Cardiovascular:      Rate and Rhythm: Normal rate and regular rhythm.      Pulses: Normal pulses.      Heart sounds: Normal heart sounds.   Pulmonary:      Effort: Pulmonary effort is normal. No respiratory distress.      Breath sounds: Normal breath sounds. No wheezing or rhonchi.   Abdominal:      General: Abdomen is flat. There is no distension.      Palpations: Abdomen is soft.   Musculoskeletal:      Right lower leg: No edema.      Left lower leg: No edema.   Neurological:      General: No focal deficit present.      Mental Status: She is alert and oriented to person, place, and time. Mental status is at baseline.   Psychiatric:         Mood and Affect: Mood normal.         Behavior: Behavior normal.         Assessment/Plan   Problem List Items Addressed This Visit             ICD-10-CM    Uncomplicated alcohol dependence (Multi) F10.20    Relevant Orders    Comprehensive metabolic panel    CBC and Auto Differential    Lipid panel "    Combined hyperlipidemia - Primary E78.2    Relevant Orders    Lipid panel    Current smoker F17.200    Relevant Orders    Comprehensive metabolic panel    CBC and Auto Differential    Lipid panel    Primary hypertension I10    Relevant Orders    Comprehensive metabolic panel    CBC and Auto Differential    Lipid panel     Other Visit Diagnoses         Codes    Vitamin D deficiency     E55.9    Hypercalcemia     E83.52    Relevant Orders    Comprehensive metabolic panel    PTH, intact    Vitamin D 25-Hydroxy,Total (for eval of Vitamin D levels)    Routine general medical examination at health care facility     Z00.00            70 y.o. female with PMHx of many years of no primary care with uncontrolled hypertension, heavy smoking, alcohol dependence here today for 6 month follow up.     ASCVD: Likely diffuse, arterial plaques found on opthalmic exam, stopped atorvastatin 20 mg due to cost, started pravastatin 40mg October 2021; increased to 80mg in feb 2022, cont. Discontinue aspirin given risk/benefit and prior GI intolerance.  - , 2/20/2024   - Repeat Lipid panel today    GERD with gastritis: improved, has declined Trial of PPI   -consider future EGD especially given family history of throat cancer and patient's smoking history, previously declined  - Symptoms reportedly improved per patient      Hypertension: Significantly improved control, likely very chronic given she has not followed with primary care in years until establishing here. With her ASCVD, ACE inhibitor warranted.   - BP at home 120-130s/70s today in office it was 138/82  - cont lisinopril 40mg, Hctz 25mg. Home blood pressure cuff calibrated 10/18/2021.     Hypercalcemia: Last levels were normal when corrected for albumin, low vitamin D levels , PTH was normal, not suppressed  - Vit D 2/2024 was 7, will not recheck as she did not take vitamin  - says she keeps forgetting to take her vitamin D  - Recommended OTC Vit D supplement  - Repeat  Vit D, PTH and Calcium level     Palpitations/dizziness: resolved. reportedly not recurrent  - previously ordered Zio patch for monitoring, not done   - normal carotid duplex in 2019     Heavy smoker: Declines CT and AAA screening or pharmacologic measures to help quit, has cut down significantly but is precontemplative about cutting down further (1.5ppd smoker now from 3ppd); >3 minutes discussion/counseling     Alcohol dependence: Has tapered down to 1 beer daily, encouraged continued tapering but is precontemplative about cutting down further.  - CMP, CBCDiff today    Past hx of Dark urine: Likely sediment in the urine that is left out overnight, no other symptoms.  We performed a UA 2/20/2024 which was unremarkable, she was given urine sample cups in case she has visible hematuria in the future. No further reported symptoms.     Health Maintenance:   -Will continue to discuss health maintenance and address what the patient is agreeable to doing. Patient expresses that she has mostly declined screening tests because she would not be interested in most interventions if these screenings were to come up positive   Mammogram- ordered in past, not done, patient declines obtaining this exam  Pap smear- not indicated given age  Low Dose CT- patient declines  Colon cancer- patient was considering Cologuard, but declines now  Vaccines-declining all vaccines at this time including flu  Discussed healthy eating and exercise  Encouraged smoking cessation  DEXA scan ordered, not done and says she does not want to do it    Follow up in 6 months.    Viji Christiansen MD    Patient seen and staffed with Dr. Lorenzo

## 2025-02-19 LAB
25(OH)D3+25(OH)D2 SERPL-MCNC: 11 NG/ML (ref 30–100)
ALBUMIN SERPL-MCNC: 4.7 G/DL (ref 3.6–5.1)
ALP SERPL-CCNC: 65 U/L (ref 37–153)
ALT SERPL-CCNC: 15 U/L (ref 6–29)
ANION GAP SERPL CALCULATED.4IONS-SCNC: 12 MMOL/L (CALC) (ref 7–17)
AST SERPL-CCNC: 17 U/L (ref 10–35)
BASOPHILS # BLD AUTO: 42 CELLS/UL (ref 0–200)
BASOPHILS NFR BLD AUTO: 0.5 %
BILIRUB SERPL-MCNC: 0.5 MG/DL (ref 0.2–1.2)
BUN SERPL-MCNC: 13 MG/DL (ref 7–25)
CALCIUM SERPL-MCNC: 10.8 MG/DL (ref 8.6–10.4)
CHLORIDE SERPL-SCNC: 101 MMOL/L (ref 98–110)
CHOLEST SERPL-MCNC: 186 MG/DL
CHOLEST/HDLC SERPL: 3.4 (CALC)
CO2 SERPL-SCNC: 23 MMOL/L (ref 20–32)
CREAT SERPL-MCNC: 0.71 MG/DL (ref 0.6–1)
EGFRCR SERPLBLD CKD-EPI 2021: 90 ML/MIN/1.73M2
EOSINOPHIL # BLD AUTO: 160 CELLS/UL (ref 15–500)
EOSINOPHIL NFR BLD AUTO: 1.9 %
ERYTHROCYTE [DISTWIDTH] IN BLOOD BY AUTOMATED COUNT: 12.9 % (ref 11–15)
GLUCOSE SERPL-MCNC: 85 MG/DL (ref 65–99)
HCT VFR BLD AUTO: 43.8 % (ref 35–45)
HDLC SERPL-MCNC: 54 MG/DL
HGB BLD-MCNC: 14.7 G/DL (ref 11.7–15.5)
LDLC SERPL CALC-MCNC: 107 MG/DL (CALC)
LYMPHOCYTES # BLD AUTO: 2134 CELLS/UL (ref 850–3900)
LYMPHOCYTES NFR BLD AUTO: 25.4 %
MCH RBC QN AUTO: 29.9 PG (ref 27–33)
MCHC RBC AUTO-ENTMCNC: 33.6 G/DL (ref 32–36)
MCV RBC AUTO: 89 FL (ref 80–100)
MONOCYTES # BLD AUTO: 554 CELLS/UL (ref 200–950)
MONOCYTES NFR BLD AUTO: 6.6 %
NEUTROPHILS # BLD AUTO: 5510 CELLS/UL (ref 1500–7800)
NEUTROPHILS NFR BLD AUTO: 65.6 %
NONHDLC SERPL-MCNC: 132 MG/DL (CALC)
PLATELET # BLD AUTO: 327 THOUSAND/UL (ref 140–400)
PMV BLD REES-ECKER: 10.5 FL (ref 7.5–12.5)
POTASSIUM SERPL-SCNC: 4.4 MMOL/L (ref 3.5–5.3)
PROT SERPL-MCNC: 7.3 G/DL (ref 6.1–8.1)
RBC # BLD AUTO: 4.92 MILLION/UL (ref 3.8–5.1)
SODIUM SERPL-SCNC: 136 MMOL/L (ref 135–146)
TRIGL SERPL-MCNC: 140 MG/DL
WBC # BLD AUTO: 8.4 THOUSAND/UL (ref 3.8–10.8)

## 2025-07-01 ENCOUNTER — APPOINTMENT (OUTPATIENT)
Dept: PRIMARY CARE | Facility: CLINIC | Age: 70
End: 2025-07-01
Payer: MEDICARE

## 2025-07-01 VITALS
OXYGEN SATURATION: 94 % | BODY MASS INDEX: 26.61 KG/M2 | DIASTOLIC BLOOD PRESSURE: 82 MMHG | SYSTOLIC BLOOD PRESSURE: 157 MMHG | HEIGHT: 59 IN | WEIGHT: 132 LBS

## 2025-07-01 DIAGNOSIS — N63.21 MASS OF UPPER OUTER QUADRANT OF LEFT BREAST: ICD-10-CM

## 2025-07-01 DIAGNOSIS — K21.9 GASTROESOPHAGEAL REFLUX DISEASE, UNSPECIFIED WHETHER ESOPHAGITIS PRESENT: ICD-10-CM

## 2025-07-01 DIAGNOSIS — R42 VERTIGO: ICD-10-CM

## 2025-07-01 DIAGNOSIS — Z12.31 ENCOUNTER FOR SCREENING MAMMOGRAM FOR BREAST CANCER: ICD-10-CM

## 2025-07-01 DIAGNOSIS — R10.13 EPIGASTRIC PAIN: Primary | ICD-10-CM

## 2025-07-01 DIAGNOSIS — R30.0 DYSURIA: ICD-10-CM

## 2025-07-01 PROCEDURE — 3079F DIAST BP 80-89 MM HG: CPT | Performed by: INTERNAL MEDICINE

## 2025-07-01 PROCEDURE — 1159F MED LIST DOCD IN RCRD: CPT | Performed by: INTERNAL MEDICINE

## 2025-07-01 PROCEDURE — 1124F ACP DISCUSS-NO DSCNMKR DOCD: CPT | Performed by: INTERNAL MEDICINE

## 2025-07-01 PROCEDURE — 3008F BODY MASS INDEX DOCD: CPT | Performed by: INTERNAL MEDICINE

## 2025-07-01 PROCEDURE — 3077F SYST BP >= 140 MM HG: CPT | Performed by: INTERNAL MEDICINE

## 2025-07-01 PROCEDURE — G2211 COMPLEX E/M VISIT ADD ON: HCPCS | Performed by: INTERNAL MEDICINE

## 2025-07-01 PROCEDURE — 99215 OFFICE O/P EST HI 40 MIN: CPT | Performed by: INTERNAL MEDICINE

## 2025-07-01 RX ORDER — OMEPRAZOLE 40 MG/1
40 CAPSULE, DELAYED RELEASE ORAL
Qty: 14 CAPSULE | Refills: 0 | Status: SHIPPED | OUTPATIENT
Start: 2025-07-01 | End: 2025-07-15

## 2025-07-01 ASSESSMENT — ENCOUNTER SYMPTOMS
ABDOMINAL PAIN: 1
FREQUENCY: 0
FATIGUE: 0
DIZZINESS: 1
VOMITING: 0
DIARRHEA: 0
NAUSEA: 0
CONSTIPATION: 0

## 2025-07-01 ASSESSMENT — PATIENT HEALTH QUESTIONNAIRE - PHQ9
SUM OF ALL RESPONSES TO PHQ9 QUESTIONS 1 AND 2: 0
1. LITTLE INTEREST OR PLEASURE IN DOING THINGS: NOT AT ALL
2. FEELING DOWN, DEPRESSED OR HOPELESS: NOT AT ALL

## 2025-07-01 ASSESSMENT — COLUMBIA-SUICIDE SEVERITY RATING SCALE - C-SSRS
1. IN THE PAST MONTH, HAVE YOU WISHED YOU WERE DEAD OR WISHED YOU COULD GO TO SLEEP AND NOT WAKE UP?: NO
2. HAVE YOU ACTUALLY HAD ANY THOUGHTS OF KILLING YOURSELF?: NO
6. HAVE YOU EVER DONE ANYTHING, STARTED TO DO ANYTHING, OR PREPARED TO DO ANYTHING TO END YOUR LIFE?: NO

## 2025-07-01 NOTE — PROGRESS NOTES
"Subjective   Patient ID: Shania Abarca is a 70 y.o. female who presents for Abdominal Pain and Dizziness (X 1 week).    Abdominal Pain  Pertinent negatives include no constipation, diarrhea, frequency, nausea or vomiting.   Dizziness  Associated symptoms include abdominal pain. Pertinent negatives include no chest pain, fatigue, nausea or vomiting.         Past medical history significant for many years of no primary care with uncontrolled hypertension, heavy smoking, alcohol dependence.     Noticed that \"something was wrong\" last week.  First noticed having dizzy spells, sensation of spinning.  Not in a few days.  Occurs sitting, lying down,  or standing.  Some pressure in the left ear recently.    Stomach has been hurting and sore.  Increased pain in the lower abdomen with urinating.  No dysuria, no increased frequency or urgency.  Pain has been pretty bad, not as bad today.    May have a knot in her breast, noticed in the left side, 2 or 3 weeks ago.    No epigastric pain or burning.  No nausea.  No diarrhea, no blood or hematochezia.       **COPIED FORWARD FOR REFERENCE**    She has cut back significant on drinking, now only 1 beer a night, 1 bud light, no hard liquor. Denies any symptoms or hx of withdrawal, Dts. (down from 12 nightly and considering cutting down further)    She is still smoking 1.5 ppd (down from 3ppd originally though precontemplative about cutting down further), interested in cutting back slowly, writes down how much she smokes when she decides to cut back again.    Last saw the eye doctor about 3 years ago and was getting injections for the macular degeneration but it was not improving and patient states she was told the injections would not improve or stop progression, still has poor vision out of right eye. Does not drive anymore.     Sleep: Good quality, falls asleep. Does get up 3-4 times to pee but falls back asleep   No issues making it to the restroom during the day.  Mood: Feeling " mood is good. Was taking care of 3 y.o. relative but finds it rewarding  Diet: Wide variety, not picky. Vegetables. Cooks for herself, not much fast food.  Exercise: Cleaning around the house.     Lives with daughter and son in law.,grandchildren and 3 y.o. niece    **COPIED FORWARD FOR REFERENCE**    Had issue of chronic heart burn in past. Previously prescribed omeprazole at the last 2 visits but she did not like having to take the extra tablet especially trying to remember before eating and concerned about cost so she has not and is not interested in changing her TUMS regimen at this time.  No melena hematochezia or BRBPR.      Has a chronic cough at baseline but has not worsened, no chest pain, no dyspnea on exertion.      Doesn't leave the house except once a year and for doctors appointments, when asked if there might be an anxiety component to this she doesn't engage much and prefers that it be left alone.  Also similar regarding screening testing.     She has declined almost all screening tests, says she would not likely want any kind of treatment, certainly not chemotherapy if the cancer were to be found.  Discussed potential treatable cancers early stage but she does not want to go through the testing anyway.  She would like to get more information about a living will and durable healthcare power of , reports her wish to not be resuscitated in the event of the cardiac arrest.      She was a very heavy smoker, 4-5 packs per day for many years, total of over 90 pack years. She also enjoys drinking beer regularly, cut down to 2-3 beers per night.     Initial visit was preceded by her eye doctor finding a plaque in the high concerning for atherosclerotic disease. She already had echocardiogram which showed impaired relaxation, carotid duplex was normal.  She was evaluated by Dr. Perez at [facility], found to have macular degeneration in the right eye and arterial plaques. Initial injections  "completed. Still has blurry vision in her right eye. Due for follow-up.     Tries to eat healthy. Does not exercise but is active cleaning the house. walks up and down the stairs.     She has 5 children, one passed in 2020 from overdose. Currently living with her daughter and family.       Objective   /82   Ht (!) 1.499 m (4' 11\")   Wt 59.9 kg (132 lb)   SpO2 94%   BMI 26.66 kg/m²     Gen: NAD, pleasant, A&Ox4  HEENT: EOMI, MMM  Neck: supple, no JVD  Pulm: lungs clear with mildly decreased air movement, normal work of breathing on room air  Breast: small rubbery mass, mildly tender, just superior and lateral to aereola on left  No cervical, axillary, supraclavicular or inguinal adenopathy  CV: RRR, no m/r/g  Abd: NABS, soft, nondistended, epigastric tenderness without guarding or rebound  Ext: no peripheral edema or cyanosis  Neuro: CN II-XII grossly intact, no focal sensory or motor deficits, (+) DHP to left  MSK: no tenderness to palpation down spine      Assessment/Plan   Problem List Items Addressed This Visit           ICD-10-CM    GERD (gastroesophageal reflux disease) K21.9    Relevant Medications    omeprazole (PriLOSEC) 40 mg DR capsule    Other Relevant Orders    Comprehensive metabolic panel    CBC and Auto Differential    Lipase    Urinalysis with Reflex Culture and Microscopic     Other Visit Diagnoses         Codes      Epigastric pain    -  Primary R10.13    Relevant Orders    Comprehensive metabolic panel    CBC and Auto Differential    Lipase    Urinalysis with Reflex Culture and Microscopic      Encounter for screening mammogram for breast cancer     Z12.31    Relevant Orders    BI mammo bilateral screening tomosynthesis      Dysuria     R30.0    Relevant Orders    Comprehensive metabolic panel    CBC and Auto Differential    Lipase    Urinalysis with Reflex Culture and Microscopic      Vertigo     R42    Relevant Orders    Referral to Physical Therapy          BPPV: Reassured and " explained physiology  - Attempted Epley maneuver and left without vertigo  - Provided information on home maneuvers  - Vestibular physical therapy referral    Abdominal pain: Consider UTI, gastritis/PUD, gallbladder, pancreatitis  - Check labs and UA  - Empiric trial of PPI  - If testing is normal and no improvement with PPI would get a CT abdomen pelvis  - Otherwise we will treat with direct diagnostic evaluation based on findings    Breast mass: Has refused mammograms for many years, agreeable today and will initiate with screening mammogram    **COPIED FORWARD FOR REFERENCE**  ASCVD: Likely diffuse, arterial plaques found on opthalmic exam, stopped atorvastatin 20 mg due to cost, started pravastatin 40mg October 2021; increased to 80mg in feb 2022, cont. Discontinue aspirin given risk/benefit and prior GI intolerance.  - , 2/20/2024    GERD with gastritis: improved, has declined Trial of PPI   -consider future EGD especially given family history of throat cancer and patient's smoking history, previously declined  - Symptoms reportedly improved per patient      Hypertension: Significantly improved control, likely very chronic given she has not followed with primary care in years until establishing here. With her ASCVD, ACE inhibitor warranted.   - BP at home 120-130s/70s today in office it was 138/82  - cont lisinopril 40mg, Hctz 25mg. Home blood pressure cuff calibrated 8/2024     Hypercalcemia: Last levels were normal when corrected for albumin, low vitamin D levels , PTH was normal, not suppressed  - Vit D 2/2024 was 7, will not recheck as she did not take vitamin  - says she keeps forgetting to take her vitamin D  - Recommended OTC Vit D supplement  - Repeat Vit D, PTH and Calcium level     Palpitations/dizziness: resolved. reportedly not recurrent  - previously ordered Zio patch for monitoring, not done   - normal carotid duplex in 2019     Heavy smoker: Declines CT and AAA screening or pharmacologic  measures to help quit, has cut down significantly but is precontemplative about cutting down further (1.5ppd smoker now from 3ppd); >3 minutes discussion/counseling     Alcohol dependence: Has tapered down to 1 beer daily, encouraged continued tapering but is precontemplative about cutting down further.    Past hx of Dark urine: Likely sediment in the urine that is left out overnight, no other symptoms.  We performed a UA 2/20/2024 which was unremarkable, she was given urine sample cups in case she has visible hematuria in the future. No further reported symptoms.     Health Maintenance:   -Will continue to discuss health maintenance and address what the patient is agreeable to doing. Patient expresses that she has mostly declined screening tests because she would not be interested in most interventions if these screenings were to come up positive   Mammogram- ordered in past, not done, patient declines obtaining this exam  Pap smear- not indicated given age  Low Dose CT- patient declines  Colon cancer- patient was considering Cologuard, but declines now  Vaccines-declining all vaccines at this time including flu  Discussed healthy eating and exercise  Encouraged smoking cessation  DEXA scan ordered, not done and says she does not want to do it        Clarke Lorenzo MD    Patient seen and staffed with Dr. Lorenzo

## 2025-07-02 LAB
25(OH)D3+25(OH)D2 SERPL-MCNC: 18 NG/ML (ref 30–100)
ALBUMIN SERPL-MCNC: 4.8 G/DL (ref 3.6–5.1)
ALP SERPL-CCNC: 72 U/L (ref 37–153)
ALT SERPL-CCNC: 10 U/L (ref 6–29)
ANION GAP SERPL CALCULATED.4IONS-SCNC: 12 MMOL/L (CALC) (ref 7–17)
APPEARANCE UR: CLEAR
AST SERPL-CCNC: 13 U/L (ref 10–35)
BACTERIA #/AREA URNS HPF: NORMAL /HPF
BACTERIA UR CULT: NORMAL
BASOPHILS # BLD AUTO: 46 CELLS/UL (ref 0–200)
BASOPHILS NFR BLD AUTO: 0.5 %
BILIRUB SERPL-MCNC: 0.4 MG/DL (ref 0.2–1.2)
BILIRUB UR QL STRIP: NEGATIVE
BUN SERPL-MCNC: 18 MG/DL (ref 7–25)
CALCIUM SERPL-MCNC: 11.4 MG/DL (ref 8.6–10.4)
CHLORIDE SERPL-SCNC: 101 MMOL/L (ref 98–110)
CO2 SERPL-SCNC: 26 MMOL/L (ref 20–32)
COLOR UR: YELLOW
CREAT SERPL-MCNC: 0.89 MG/DL (ref 0.6–1)
EGFRCR SERPLBLD CKD-EPI 2021: 69 ML/MIN/1.73M2
EOSINOPHIL # BLD AUTO: 285 CELLS/UL (ref 15–500)
EOSINOPHIL NFR BLD AUTO: 3.1 %
ERYTHROCYTE [DISTWIDTH] IN BLOOD BY AUTOMATED COUNT: 13.1 % (ref 11–15)
GLUCOSE SERPL-MCNC: 87 MG/DL (ref 65–99)
GLUCOSE UR QL STRIP: NEGATIVE
HCT VFR BLD AUTO: 45.9 % (ref 35–45)
HGB BLD-MCNC: 14.6 G/DL (ref 11.7–15.5)
HGB UR QL STRIP: NEGATIVE
HYALINE CASTS #/AREA URNS LPF: NORMAL /LPF
KETONES UR QL STRIP: NEGATIVE
LEUKOCYTE ESTERASE UR QL STRIP: NEGATIVE
LIPASE SERPL-CCNC: 19 U/L (ref 7–60)
LYMPHOCYTES # BLD AUTO: 2484 CELLS/UL (ref 850–3900)
LYMPHOCYTES NFR BLD AUTO: 27 %
MAGNESIUM SERPL-MCNC: 2 MG/DL (ref 1.5–2.5)
MCH RBC QN AUTO: 29.1 PG (ref 27–33)
MCHC RBC AUTO-ENTMCNC: 31.8 G/DL (ref 32–36)
MCV RBC AUTO: 91.4 FL (ref 80–100)
MONOCYTES # BLD AUTO: 552 CELLS/UL (ref 200–950)
MONOCYTES NFR BLD AUTO: 6 %
NEUTROPHILS # BLD AUTO: 5833 CELLS/UL (ref 1500–7800)
NEUTROPHILS NFR BLD AUTO: 63.4 %
NITRITE UR QL STRIP: NEGATIVE
PH UR STRIP: 6 [PH] (ref 5–8)
PHOSPHATE SERPL-MCNC: 4 MG/DL (ref 2.1–4.3)
PLATELET # BLD AUTO: 397 THOUSAND/UL (ref 140–400)
PMV BLD REES-ECKER: 9.8 FL (ref 7.5–12.5)
POTASSIUM SERPL-SCNC: 4.8 MMOL/L (ref 3.5–5.3)
PROT SERPL-MCNC: 7.3 G/DL (ref 6.1–8.1)
PROT UR QL STRIP: NEGATIVE
PTH-INTACT SERPL-MCNC: NORMAL PG/ML
RBC # BLD AUTO: 5.02 MILLION/UL (ref 3.8–5.1)
RBC #/AREA URNS HPF: NORMAL /HPF
SERVICE CMNT-IMP: NORMAL
SODIUM SERPL-SCNC: 139 MMOL/L (ref 135–146)
SP GR UR STRIP: 1.01 (ref 1–1.03)
SQUAMOUS #/AREA URNS HPF: NORMAL /HPF
WBC # BLD AUTO: 9.2 THOUSAND/UL (ref 3.8–10.8)
WBC #/AREA URNS HPF: NORMAL /HPF

## 2025-07-08 DIAGNOSIS — K21.9 GASTROESOPHAGEAL REFLUX DISEASE, UNSPECIFIED WHETHER ESOPHAGITIS PRESENT: Primary | ICD-10-CM

## 2025-07-08 DIAGNOSIS — R10.13 EPIGASTRIC PAIN: ICD-10-CM

## 2025-07-08 NOTE — PROGRESS NOTES
Subjective   Patient ID: Shania Abarca is a 70 y.o. female who presents for No chief complaint on file..      Objective   Physical Exam    There were no vitals taken for this visit.       Assessment/Plan         Clarke Lorenzo MD

## 2025-08-18 ENCOUNTER — APPOINTMENT (OUTPATIENT)
Dept: PRIMARY CARE | Facility: CLINIC | Age: 70
End: 2025-08-18
Payer: MEDICARE

## 2025-08-19 DIAGNOSIS — I10 PRIMARY HYPERTENSION: ICD-10-CM

## 2025-08-19 DIAGNOSIS — E78.5 DYSLIPIDEMIA: ICD-10-CM

## 2025-08-19 RX ORDER — HYDROCHLOROTHIAZIDE 25 MG/1
25 TABLET ORAL DAILY
Qty: 90 TABLET | Refills: 3 | Status: SHIPPED | OUTPATIENT
Start: 2025-08-19

## 2025-08-19 RX ORDER — PRAVASTATIN SODIUM 80 MG/1
80 TABLET ORAL DAILY
Qty: 90 TABLET | Refills: 3 | Status: SHIPPED | OUTPATIENT
Start: 2025-08-19

## 2025-08-19 RX ORDER — LISINOPRIL 40 MG/1
40 TABLET ORAL DAILY
Qty: 90 TABLET | Refills: 3 | Status: SHIPPED | OUTPATIENT
Start: 2025-08-19

## 2025-08-26 ENCOUNTER — HOSPITAL ENCOUNTER (OUTPATIENT)
Dept: RADIOLOGY | Facility: CLINIC | Age: 70
End: 2025-08-26
Payer: MEDICARE

## 2025-08-26 ENCOUNTER — APPOINTMENT (OUTPATIENT)
Dept: PRIMARY CARE | Facility: CLINIC | Age: 70
End: 2025-08-26
Payer: MEDICARE

## 2025-08-26 DIAGNOSIS — E83.52 HYPERCALCEMIA: Primary | ICD-10-CM

## 2025-08-27 ENCOUNTER — HOSPITAL ENCOUNTER (OUTPATIENT)
Dept: RADIOLOGY | Facility: CLINIC | Age: 70
Discharge: HOME | End: 2025-08-27
Payer: MEDICARE

## 2025-08-27 VITALS — HEIGHT: 59 IN | WEIGHT: 132.06 LBS | BODY MASS INDEX: 26.62 KG/M2

## 2025-08-27 DIAGNOSIS — N60.22 LUMPY BREASTS, LEFT: ICD-10-CM

## 2025-08-27 DIAGNOSIS — Z12.31 ENCOUNTER FOR SCREENING MAMMOGRAM FOR BREAST CANCER: ICD-10-CM

## 2025-08-27 PROCEDURE — 76982 USE 1ST TARGET LESION: CPT

## 2025-08-27 PROCEDURE — 77062 BREAST TOMOSYNTHESIS BI: CPT

## 2025-08-27 PROCEDURE — 76642 ULTRASOUND BREAST LIMITED: CPT | Performed by: STUDENT IN AN ORGANIZED HEALTH CARE EDUCATION/TRAINING PROGRAM

## 2025-08-27 PROCEDURE — 77066 DX MAMMO INCL CAD BI: CPT | Performed by: STUDENT IN AN ORGANIZED HEALTH CARE EDUCATION/TRAINING PROGRAM

## 2025-08-27 PROCEDURE — G0279 TOMOSYNTHESIS, MAMMO: HCPCS | Performed by: STUDENT IN AN ORGANIZED HEALTH CARE EDUCATION/TRAINING PROGRAM

## 2025-08-27 PROCEDURE — 76642 ULTRASOUND BREAST LIMITED: CPT | Mod: LT

## 2025-08-28 LAB
25(OH)D3+25(OH)D2 SERPL-MCNC: 17 NG/ML (ref 30–100)
ALBUMIN SERPL-MCNC: 4.5 G/DL (ref 3.6–5.1)
BUN SERPL-MCNC: 10 MG/DL (ref 7–25)
BUN/CREAT SERPL: ABNORMAL (CALC) (ref 6–22)
CALCIUM SERPL-MCNC: 10.7 MG/DL (ref 8.6–10.4)
CHLORIDE SERPL-SCNC: 96 MMOL/L (ref 98–110)
CO2 SERPL-SCNC: 31 MMOL/L (ref 20–32)
CREAT SERPL-MCNC: 0.75 MG/DL (ref 0.6–1)
EGFRCR SERPLBLD CKD-EPI 2021: 85 ML/MIN/1.73M2
GLUCOSE SERPL-MCNC: 116 MG/DL (ref 65–99)
PHOSPHATE SERPL-MCNC: 4 MG/DL (ref 2.1–4.3)
POTASSIUM SERPL-SCNC: 4.3 MMOL/L (ref 3.5–5.3)
PTH-INTACT SERPL-MCNC: 67 PG/ML (ref 16–77)
SODIUM SERPL-SCNC: 135 MMOL/L (ref 135–146)

## 2025-10-09 ENCOUNTER — APPOINTMENT (OUTPATIENT)
Dept: PRIMARY CARE | Facility: CLINIC | Age: 70
End: 2025-10-09
Payer: MEDICARE